# Patient Record
Sex: FEMALE | Race: WHITE | NOT HISPANIC OR LATINO | Employment: OTHER | URBAN - METROPOLITAN AREA
[De-identification: names, ages, dates, MRNs, and addresses within clinical notes are randomized per-mention and may not be internally consistent; named-entity substitution may affect disease eponyms.]

---

## 2021-05-18 ENCOUNTER — TELEPHONE (OUTPATIENT)
Dept: PAIN MEDICINE | Facility: CLINIC | Age: 86
End: 2021-05-18

## 2021-05-18 NOTE — TELEPHONE ENCOUNTER
LMOM FOR PATIENT TO RETURN OUR CALL  REGARDING MISSING INFORMATION FOR REG      INSURANCE  ER CONTACT  PCP     ETC

## 2021-05-26 ENCOUNTER — CONSULT (OUTPATIENT)
Dept: PAIN MEDICINE | Facility: CLINIC | Age: 86
End: 2021-05-26
Payer: COMMERCIAL

## 2021-05-26 VITALS
WEIGHT: 205 LBS | HEART RATE: 90 BPM | TEMPERATURE: 97.3 F | DIASTOLIC BLOOD PRESSURE: 80 MMHG | HEIGHT: 66 IN | SYSTOLIC BLOOD PRESSURE: 158 MMHG | BODY MASS INDEX: 32.95 KG/M2

## 2021-05-26 DIAGNOSIS — M54.16 LUMBAR RADICULOPATHY: ICD-10-CM

## 2021-05-26 DIAGNOSIS — E66.01 MORBID OBESITY (HCC): ICD-10-CM

## 2021-05-26 DIAGNOSIS — M48.061 LUMBAR FORAMINAL STENOSIS: Primary | ICD-10-CM

## 2021-05-26 DIAGNOSIS — R29.898 WEAKNESS OF BOTH LOWER EXTREMITIES: ICD-10-CM

## 2021-05-26 DIAGNOSIS — F32.1 MODERATE MAJOR DEPRESSION (HCC): ICD-10-CM

## 2021-05-26 DIAGNOSIS — E11.8 DISORDER ASSOCIATED WITH TYPE 2 DIABETES MELLITUS (HCC): ICD-10-CM

## 2021-05-26 PROBLEM — M51.35 DEGENERATION OF THORACOLUMBAR INTERVERTEBRAL DISC: Status: ACTIVE | Noted: 2021-05-26

## 2021-05-26 PROBLEM — H35.3290 EXUDATIVE AGE-RELATED MACULAR DEGENERATION (HCC): Status: ACTIVE | Noted: 2021-05-26

## 2021-05-26 PROBLEM — E11.9 DIABETES (HCC): Status: ACTIVE | Noted: 2021-05-26

## 2021-05-26 PROBLEM — R93.89 ABNORMAL CHEST X-RAY: Status: ACTIVE | Noted: 2017-10-08

## 2021-05-26 PROBLEM — N18.31 CHRONIC KIDNEY DISEASE, STAGE 3A (HCC): Status: ACTIVE | Noted: 2021-05-26

## 2021-05-26 PROBLEM — E78.5 HYPERLIPIDEMIA: Status: ACTIVE | Noted: 2021-05-26

## 2021-05-26 PROBLEM — R33.9 URINARY RETENTION: Status: ACTIVE | Noted: 2017-10-07

## 2021-05-26 PROBLEM — R93.89 ABNORMAL MRI: Status: ACTIVE | Noted: 2017-10-07

## 2021-05-26 PROBLEM — IMO0002 DEGENERATION OF INTERVERTEBRAL DISC: Status: ACTIVE | Noted: 2021-05-26

## 2021-05-26 PROBLEM — J45.909 ASTHMA: Status: ACTIVE | Noted: 2021-05-26

## 2021-05-26 PROBLEM — I10 ESSENTIAL HYPERTENSION: Status: ACTIVE | Noted: 2021-05-26

## 2021-05-26 PROCEDURE — 99204 OFFICE O/P NEW MOD 45 MIN: CPT | Performed by: ANESTHESIOLOGY

## 2021-05-26 RX ORDER — IBUPROFEN 200 MG
TABLET ORAL EVERY 6 HOURS PRN
COMMUNITY

## 2021-05-26 RX ORDER — GLIMEPIRIDE 1 MG/1
TABLET ORAL EVERY 24 HOURS
COMMUNITY
Start: 2021-05-17

## 2021-05-26 RX ORDER — FLUTICASONE PROPIONATE 50 MCG
SPRAY, SUSPENSION (ML) NASAL EVERY 24 HOURS
COMMUNITY

## 2021-05-26 RX ORDER — CHOLECALCIFEROL (VITAMIN D3) 100000/G
POWDER (GRAM) MISCELLANEOUS EVERY 24 HOURS
COMMUNITY

## 2021-05-26 RX ORDER — ALBUTEROL SULFATE 2.5 MG/3ML
2.5 SOLUTION RESPIRATORY (INHALATION) EVERY 4 HOURS PRN
COMMUNITY

## 2021-05-26 NOTE — PROGRESS NOTES
Assessment  1  Lumbar foraminal stenosis    2  Lumbar radiculopathy    3  Moderate major depression ([address] )    4  Morbid obesity ([address] )    5  Weakness of both lower extremities    6  Disorder associated with type 2 diabetes mellitus ([address] )        Plan    The patient's pain persists despite time, relative rest, activity modification and therapy  I believe that Aj Watson would benefit from a lumbar epidural steroid injection to diminish any inflammatory component of her pain  I will initially use a transforaminal approach to better concentrate the steroid along the affected nerve root  The injection may need to be repeated based on the degree of pain relief following the initial injection  As I did mention to the patient her daughter that this will we may help with the pain but would not help with her weakness and as she is undergoing and I have undergone physical therapy I am having her evaluated by Neurology  Given the patient's history of diabetes, there may be an increased risk of infection in association with the procedure although the overall risk is low  In addition, following the injection there may be a transient elevation in serum glucose levels for several days  The patient understands that this may require additional glycemic coverage in coordination with the treating physician  In the office today, we reviewed the nature of the patient's pathology in depth using  diagrams and models  I discussed the approach I would use for the epidural steroid injection and provided literature for home review  The patient understands the risks associated with the procedure including but not limited to bleeding, infection, tissue injury, decreased immunity secondary to steroid injection, exacerbation of symptoms, allergic reaction, spinal headache, and paralysis and provided verbal consent      My impressions and treatment recommendations were discussed in detail with the patient who verbalized understanding and had no further questions  Discharge instructions were provided  I personally saw and examined the patient and I agree with the above discussed plan of care  This note is created using dictation transcription  It may contain typographical errors, grammatical errors, improperly dictated words, background noise and other errors  Orders Placed This Encounter   Procedures    FL spine and pain procedure     Standing Status:   Future     Standing Expiration Date:   5/26/2025     Order Specific Question:   Reason for Exam:     Answer:   Left L5 and S1 TFESi 1     Order Specific Question:   Anticoagulant hold needed? Answer:   no    FL spine and pain procedure     Standing Status:   Future     Standing Expiration Date:   5/26/2025     Order Specific Question:   Reason for Exam:     Answer:   Left L5 S1 TFESi 2     Order Specific Question:   Anticoagulant hold needed? Answer:   no    Ambulatory referral to Neurology     Standing Status:   Future     Standing Expiration Date:   5/26/2022     Referral Priority:   Routine     Referral Type:   Consult - AMB     Referral Reason:   Specialty Services Required     Referred to Provider:   Loly Wynn MD     Requested Specialty:   Neurology     Number of Visits Requested:   1     Expiration Date:   5/26/2022     New Medications Ordered This Visit   Medications    Citalopram Hydrobromide (CELEXA PO)     Sig: Take by mouth    fluticasone (Flonase) 50 mcg/act nasal spray     Sig: every 24 hours    glimepiride (AMARYL) 1 mg tablet     Sig: every 24 hours    fluticasone-salmeterol (Advair Diskus) 100-50 mcg/dose inhaler     Sig: Inhale 1 puff 2 (two) times a day as needed     Substitution to a formulary equivalent within the same pharmaceutical class is authorized      Cholecalciferol (Vitamin D3) 788378 UNIT/GM POWD     Sig: every 24 hours    glucose blood test strip     Sig: test once daily    albuterol (2 5 mg/3 mL) 0 083 % nebulizer solution     Sig: Inhale 2 5 mg every 4 (four) hours as needed    ibuprofen (MOTRIN) 200 mg tablet     Sig: Take by mouth every 6 (six) hours as needed for mild pain     Referred By: Self  History of Present Illness    Aftab Artis is a 80 y o  female with a longstanding history of lower extremity weakness with more recently left lower extremity pain  Back in 2017 she had difficulty ambulating and was sent to the emergency department  Full MRI workup and neurological evaluation was performed including spinal tap  More recently she has been having pain and numbness down the posterior lateral aspect of her left leg  MRI does reveal moderate foraminal stenosis at L5-S1  Her pain is moderate to severe occasional worse at night  Describes burning cramping shooting and has subjective weakness of her lower limbs  I have personally reviewed and/or updated the patient's past medical history, past surgical history, family history, social history, current medications, allergies, and vital signs today  Review of Systems   Constitutional: Negative for fever and unexpected weight change  HENT: Negative for trouble swallowing  Eyes: Negative for visual disturbance  Respiratory: Negative for shortness of breath and wheezing  Cardiovascular: Negative for chest pain and palpitations  Gastrointestinal: Negative for constipation, diarrhea, nausea and vomiting  Endocrine: Negative for cold intolerance, heat intolerance and polydipsia  Genitourinary: Negative for difficulty urinating and frequency  Musculoskeletal: Negative for arthralgias, gait problem, joint swelling and myalgias  Skin: Negative for rash  Neurological: Negative for dizziness, seizures, syncope, weakness and headaches  Hematological: Does not bruise/bleed easily  Psychiatric/Behavioral: Negative for dysphoric mood  All other systems reviewed and are negative        Patient Active Problem List   Diagnosis    [facility]bid [facility]    Abnormal chest x-ray    Asthma    Abnormal MRI    Chronic kidney disease, stage 3a (HCC)    Degeneration of intervertebral disc    Degeneration of thoracolumbar intervertebral disc    Diabetes (HCC)    Disorder associated with type 2 diabetes mellitus (HCC)    Essential hypertension    Exudative age-related macular degeneration (HCC)    Hyperlipidemia    Lower extremity weakness    Moderate [facility] )    Urinary retention       Past Medical History:   Diagnosis Date    Asthma     Depression     Diabetes mellitus ([facility] )        Past Surgical History:   Procedure Laterality Date    GALLBLADDER SURGERY      HYSTERECTOMY         History reviewed  No pertinent family history  Social History     Occupational History    Not on file   Tobacco Use    Smoking status: Never Smoker    Smokeless tobacco: Never Used   Substance and Sexual Activity    Alcohol use: Not Currently    Drug use: Not Currently    Sexual activity: Not Currently       Current Outpatient Medications on File Prior to Visit   Medication Sig    albuterol (2 5 mg/3 mL) 0 083 % nebulizer solution Inhale 2 5 mg every 4 (four) hours as needed    Cholecalciferol (Vitamin D3) 544727 UNIT/GM POWD every 24 hours    Citalopram Hydrobromide (CELEXA PO) Take by mouth    fluticasone (Flonase) 50 mcg/act nasal spray every 24 hours    fluticasone-salmeterol (Advair Diskus) 100-50 mcg/dose inhaler Inhale 1 puff 2 (two) times a day as needed    glimepiride (AMARYL) 1 mg tablet every 24 hours    glucose blood test strip test once daily    ibuprofen (MOTRIN) 200 mg tablet Take by mouth every 6 (six) hours as needed for mild pain     No current facility-administered medications on file prior to visit          Allergies   Allergen Reactions    Metformin Other (See Comments)       Physical Exam    /80 (BP Location: Left arm, Patient Position: Sitting, Cuff Size: Standard)   Pulse 90   Temp (!) 97 3 °F (36 3 °C)   Ht 5' 6" (1 676 m)   Wt 93 kg (205 lb)   BMI 33 09 kg/m²     Constitutional: normal, well developed, well nourished, alert, in no distress and non-toxic and no overt pain behavior  and overweight  Eyes: anicteric  HEENT: grossly intact  Neck: supple, symmetric, trachea midline and no masses   Pulmonary:even and unlabored  Cardiovascular:No edema or pitting edema present  Skin:Normal without rashes or lesions and well hydrated  Psychiatric:Mood and affect appropriate  Neurologic:Cranial Nerves II-XII grossly intact  Musculoskeletal:normal and ambulates with cane, difficulty going from sitting to standing sitting position I cannot appreciate any focal motor deficit or lower limbs, negative straight leg raising decreased sensation left L5 distribution to pinwheel, deep tendon reflexes diminished but symmetrical bilateral patella Achilles    Imaging  Xray Lumbar @ [facility] 5-13-21  Impression:  Thoracolumbar degenerative disc disease  MR Thoracic spine wo/w @ [facility] 10-7-17  Impression:  1  Small scattered area of thoracic cord signal abnormality without enhancement which may represent a demyelinating process  2  No disc herniation, central canal narrowing, or foraminal narrowing in the thoracic spine  MR Lumbar Spine wo/w @ [facility] 10-7-17  Impression:  1  Diffused disc bulge with overlaying endplate osteophytes at L5-S1 causing moderate bilateral foraminal narrowing  2  Mild disc bulges at L3-L4 and L4-5 causing mild bilateral foraminal narrowing  MR Spinal cord survey @ [facility] 10-4-17    Impression:  1  No evidence of spinal stenosis at any level  2  Mild degenerative changes of the cervical spine  Straightening of the cervical and thoracic spine, possibly positional     I have personally reviewed pertinent films in PACS

## 2021-05-26 NOTE — PATIENT INSTRUCTIONS
Epidural Steroid Injection   AMBULATORY CARE:   What you need to know about an epidural steroid injection (EVER):  An EVER is a procedure to inject steroid medicine into the epidural space  The epidural space is between your spinal cord and vertebrae  Steroids reduce inflammation and fluid buildup in your spine that may be causing pain  You may be given pain medicine along with the steroids  How to prepare for an EVER:  Your healthcare provider will talk to you about how to prepare for your procedure  He or she will tell you what medicines to take or not take on the day of your procedure  You may need to stop taking blood thinners or other medicines several days before your procedure  You may need to adjust any diabetes medicine you take on the day of your procedure  Steroid medicine can increase your blood sugar level  Arrange for someone to drive you home when you are discharged  What will happen during an EVER:   · You will be given medicine to numb the procedure area  You will be awake for the procedure, but you will not feel pain  You may also be given medicine to help you relax  Contrast liquid will be used to help your healthcare provider see the area better  Tell the healthcare provider if you have ever had an allergic reaction to contrast liquid  · Your healthcare provider may place the needle into your neck area, middle of your back, or tailbone area  He may inject the medicine next to the nerves that are causing your pain  He may instead inject the medicine into a larger area of the epidural space  This helps the medicine spread to more nerves  Your healthcare provider will use a fluoroscope to help guide the needle to the right place  A fluoroscope is a type of x-ray  After the procedure, a bandage will be placed over the injection site to prevent infection  What will happen after an EVER:  You will have a bandage over the injection site to prevent infection   Your healthcare provider will tell you when you can bathe and any activity guidelines  You will be able to go home  Risks of an EVER:  You may have temporary or permanent nerve damage or paralysis  You may have bleeding or develop a serious infection, such as meningitis (swelling of the brain coverings)  An abscess may also develop  An abscess is a pus-filled area under the skin  You may need surgery to fix the abscess  You may have a seizure, anxiety, or trouble sleeping  If you are a man, you may have temporary erectile dysfunction (not able to have an erection)  Call your local emergency number (911 in the [address] [place] [address],[address]) if:   · You have a seizure  · You have trouble moving your legs  Seek care immediately if:   · Blood soaks through your bandage  · You have a fever or chills, severe back pain, and the procedure area is sensitive to the touch  · You cannot control when you urinate or have a bowel movement  Call your doctor if:   · You have weakness or numbness in your legs  · Your wound is red, swollen, or draining pus  · You have nausea or are vomiting  · Your face or neck is red and you feel warm  · You have more pain than you had before the procedure  · You have swelling in your hands or feet  · You have questions or concerns about your condition or care  Care for your wound as directed: You may remove the bandage before you go to bed the day of your procedure  You may take a shower, but do not take a bath for at least 24 hours  Self-care:   · Do not drive,  use machines, or do strenuous activity for 24 hours after your procedure or as directed  · Continue other treatments  as directed  Steroid injections alone will not control your pain  The injections are meant to be used with other treatments, such as physical therapy  Follow up with your doctor as directed:  Write down your questions so you remember to ask them during your visits     © Copyright [employer] 2020 Information is for End User's use only and may not be sold, redistributed or otherwise used for commercial purposes  All illustrations and images included in CareNotes® are the copyrighted property of A [employer] M [employer]  or [employer] Nowak  The above information is an  only  It is not intended as medical advice for individual conditions or treatments  Talk to your doctor, nurse or pharmacist before following any medical regimen to see if it is safe and effective for you

## 2021-06-11 ENCOUNTER — HOSPITAL ENCOUNTER (OUTPATIENT)
Dept: RADIOLOGY | Facility: CLINIC | Age: 86
Discharge: HOME | End: 2021-06-11
Attending: ANESTHESIOLOGY | Admitting: ANESTHESIOLOGY
Payer: COMMERCIAL

## 2021-06-11 VITALS
DIASTOLIC BLOOD PRESSURE: 79 MMHG | OXYGEN SATURATION: 95 % | SYSTOLIC BLOOD PRESSURE: 156 MMHG | HEART RATE: 96 BPM | TEMPERATURE: 96.8 F | RESPIRATION RATE: 20 BRPM

## 2021-06-11 DIAGNOSIS — M54.16 LUMBAR RADICULOPATHY: ICD-10-CM

## 2021-06-11 DIAGNOSIS — M48.061 LUMBAR FORAMINAL STENOSIS: ICD-10-CM

## 2021-06-11 PROCEDURE — 64483 NJX AA&/STRD TFRM EPI L/S 1: CPT | Performed by: ANESTHESIOLOGY

## 2021-06-11 PROCEDURE — 64484 NJX AA&/STRD TFRM EPI L/S EA: CPT | Performed by: ANESTHESIOLOGY

## 2021-06-11 RX ORDER — METHYLPREDNISOLONE ACETATE 80 MG/ML
160 INJECTION, SUSPENSION INTRA-ARTICULAR; INTRALESIONAL; INTRAMUSCULAR; PARENTERAL; SOFT TISSUE ONCE
Status: COMPLETED | OUTPATIENT
Start: 2021-06-11 | End: 2021-06-11

## 2021-06-11 RX ADMIN — IOHEXOL 2 ML: 300 INJECTION, SOLUTION INTRAVENOUS at 13:03

## 2021-06-11 RX ADMIN — LIDOCAINE HYDROCHLORIDE 2 ML: 20 INJECTION, SOLUTION EPIDURAL; INFILTRATION; INTRACAUDAL at 13:03

## 2021-06-11 RX ADMIN — METHYLPREDNISOLONE ACETATE 160 MG: 80 INJECTION, SUSPENSION INTRA-ARTICULAR; INTRALESIONAL; INTRAMUSCULAR; SOFT TISSUE at 13:03

## 2021-06-11 NOTE — DISCHARGE INSTRUCTIONS
Epidural Steroid Injection   WHAT YOU NEED TO KNOW:   An epidural steroid injection (EVER) is a procedure to inject steroid medicine into the epidural space  The epidural space is between your spinal cord and vertebrae  Steroids reduce inflammation and fluid buildup in your spine that may be causing pain  You may be given pain medicine along with the steroids  ACTIVITY  · Do not drive or operate machinery today  · No strenuous activity today - bending, lifting, etc   · You may resume normal activites starting tomorrow - start slowly and as tolerated  · You may shower today, but no tub baths or hot tubs  · You may have numbness for several hours from the local anesthetic  Please use caution and common sense, especially with weight-bearing activities  CARE OF THE INJECTION SITE  · If you have soreness or pain, apply ice to the area today (20 minutes on/20 minutes off)  · Starting tomorrow, you may use warm, moist heat or ice if needed  · You may have an increase or change in your discomfort for 36-48 hours after your treatment  · Apply ice and continue with any pain medication you have been prescribed  · Notify the [facility] if you have any of the following: redness, drainage, swelling, headache, stiff neck or fever above 100°F     SPECIAL INSTRUCTIONS  · Our office will contact you in approximately 7 days for a progress report  MEDICATIONS  · Continue to take all routine medications  · Our office may have instructed you to hold some medications  As no general anesthesia was used in today's procedure, you should not experience any side effects related to anesthesia  If you have a problem specifically related to your procedure, please call our office at [phone]  Problems not related to your procedure should be directed to your primary care physician 
no

## 2021-06-11 NOTE — H&P
History of Present Illness: The patient is a 80 y o  female who presents with complaints of low back and leg pain      Patient Active Problem List   Diagnosis    Mor[facility] )    Abnormal chest x-ray    Asthma    Abnormal MRI    Chronic kidney disease, stage 3a ([facility] )    Degeneration of intervertebral disc    Degeneration of thoracolumbar intervertebral disc    Diabetes ([facility] )    [facility] with [facility]    Essential hypertension    Exudative age-related macular degeneration (HCC)    Hyperlipidemia    Lower extremity weakness    Moderate major depression (HCC)    Urinary retention    Lumbar foraminal stenosis    Lumbar radiculopathy       Past Medical History:   Diagnosis Date    Asthma     Depression     [facility]        Past Surgical History:   Procedure Laterality Date    GALLBLADDER SURGERY      HYSTERECTOMY           Current Outpatient Medications:     albuterol (2 5 mg/3 mL) 0 083 % nebulizer solution, Inhale 2 5 mg every 4 (four) hours as needed, Disp: , Rfl:     Cholecalciferol (Vitamin D3) 566598 UNIT/GM POWD, every 24 hours, Disp: , Rfl:     Citalopram Hydrobromide (CELEXA PO), Take by mouth, Disp: , Rfl:     fluticasone (Flonase) 50 mcg/act nasal spray, every 24 hours, Disp: , Rfl:     fluticasone-salmeterol (Advair Diskus) 100-50 mcg/dose inhaler, Inhale 1 puff 2 (two) times a day as needed, Disp: , Rfl:     glimepiride (AMARYL) 1 mg tablet, every 24 hours, Disp: , Rfl:     glucose blood test strip, test once daily, Disp: , Rfl:     ibuprofen (MOTRIN) 200 mg tablet, Take by mouth every 6 (six) hours as needed for mild pain, Disp: , Rfl:     Current Facility-Administered Medications:     iohexol (OMNIPAQUE) 300 mg/mL injection 50 mL, 50 mL, Epidural, Once, Jignesh Baker DO    lidocaine (PF) (XYLOCAINE-MPF) 2 % injection 5 mL, 5 mL, Epidural, Once, Jignesh Baker DO    methylPREDNISolone acetate (DEPO-MEDROL) injection 160 mg, 160 mg, Epidural, Once, Jignesh Baker, DO    Allergies   Allergen Reactions    Metformin Other (See Comments)       Physical Exam:   Vitals:    06/11/21 1252   BP: 162/81   Pulse: 67   Resp: 20   Temp: (!) 96 8 °F (36 °C)     General: Awake, Alert, Oriented x 3, Mood and affect appropriate  Respiratory: Respirations even and unlabored  Cardiovascular: Peripheral pulses intact; no edema  Musculoskeletal Exam:  Decreased range of motion lumbar spine    ASA Score: III    Patient/Chart Verification  Patient ID Verified: Verbal  ID Band Applied: No  Consents Confirmed: Procedural, To be obtained in the Pre-Procedure area  H&P( within 30 days) Verified: To be obtained in the Pre-Procedure area  Allergies Reviewed: Yes  Anticoag/NSAID held?: NA  Currently on antibiotics?: No    Assessment:   1  Lumbar foraminal stenosis    2   Lumbar radiculopathy        Plan: Left L5 and S1 TFESi 1

## 2021-06-11 NOTE — NURSING NOTE
Pt and daughter Anika Enriquez advised that pt should contact PCP d/t O2 sats 88-89%on room air both with and without mask on  Pt states that she gets like this when she is nervous  Post procedure O2 sat 95%  Per daughter, pt required O2 while in the hospital last admission  The patient and daughter verbalized understanding

## 2021-06-11 NOTE — H&P
History of Present Illness: The patient is a 80 y o  female who presents with complaints of low back and leg pain      Patient Active Problem List   Diagnosis    Morbid [facility] [address] )    Abnormal chest x-ray    Asthma    Abnormal MRI    Chronic kidney disease, stage 3a ([facility] [facility] )    Degeneration of intervertebral disc    Degeneration of thoracolumbar intervertebral disc    Diabetes ([facility] )    [facility] with [facility]    Essential hypertension    Exudative age-related macular degeneration (HCC)    Hyperlipidemia    Lower extremity weakness    Moderate major depression (HCC)    Urinary retention    Lumbar foraminal stenosis    Lumbar radiculopathy       Past Medical History:   Diagnosis Date    Asthma     Depression     [facility]        Past Surgical History:   Procedure Laterality Date    GALLBLADDER SURGERY      HYSTERECTOMY           Current Outpatient Medications:     albuterol (2 5 mg/3 mL) 0 083 % nebulizer solution, Inhale 2 5 mg every 4 (four) hours as needed, Disp: , Rfl:     Cholecalciferol (Vitamin D3) 099206 UNIT/GM POWD, every 24 hours, Disp: , Rfl:     Citalopram Hydrobromide (CELEXA PO), Take by mouth, Disp: , Rfl:     fluticasone (Flonase) 50 mcg/act nasal spray, every 24 hours, Disp: , Rfl:     fluticasone-salmeterol (Advair Diskus) 100-50 mcg/dose inhaler, Inhale 1 puff 2 (two) times a day as needed, Disp: , Rfl:     glimepiride (AMARYL) 1 mg tablet, every 24 hours, Disp: , Rfl:     glucose blood test strip, test once daily, Disp: , Rfl:     ibuprofen (MOTRIN) 200 mg tablet, Take by mouth every 6 (six) hours as needed for mild pain, Disp: , Rfl:     Current Facility-Administered Medications:     iohexol (OMNIPAQUE) 300 mg/mL injection 50 mL, 50 mL, Epidural, Once, Jignesh Baker DO    lidocaine (PF) (XYLOCAINE-MPF) 2 % injection 5 mL, 5 mL, Epidural, Once, Jignesh Baker DO    methylPREDNISolone acetate (DEPO-MEDROL) injection 160 mg, 160 mg, Epidural, Once, [employer], DO    Allergies   Allergen Reactions    Metformin Other (See Comments)       Physical Exam:   General: Awake, Alert, Oriented x 3, Mood and affect appropriate  Respiratory: Respirations even and unlabored  Cardiovascular: Peripheral pulses intact; no edema  Musculoskeletal Exam:  Decreased range of motion lumbar spine    ASA Score: II         Assessment:  Lumbar foraminal stenosis, lumbar radiculitis    Plan: Left L5 and S1 TFESi 1

## 2021-06-18 ENCOUNTER — TELEPHONE (OUTPATIENT)
Dept: PAIN MEDICINE | Facility: CLINIC | Age: 86
End: 2021-06-18

## 2021-06-18 NOTE — TELEPHONE ENCOUNTER
Pt is returning call stating she has numbness from her legs to her feet and some pain     Pain level 1/10

## 2021-06-24 NOTE — TELEPHONE ENCOUNTER
Attempted to contact pt, left a detailed message on machine per medical communication consent on file advising of Dr Shelly Esposito and provided contact info: [phone]  Provided cb number and office hours

## 2021-06-24 NOTE — TELEPHONE ENCOUNTER
Pt can not get in with Dr Pro Vaughn until 11/21  Is there anyone else you can referrer pt to   Please call Amelia Buchanan (daughter) # [phone]

## 2021-07-02 ENCOUNTER — HOSPITAL ENCOUNTER (OUTPATIENT)
Dept: RADIOLOGY | Facility: CLINIC | Age: 86
Discharge: HOME | End: 2021-07-02
Attending: ANESTHESIOLOGY | Admitting: ANESTHESIOLOGY
Payer: COMMERCIAL

## 2021-07-02 VITALS
RESPIRATION RATE: 20 BRPM | HEART RATE: 97 BPM | SYSTOLIC BLOOD PRESSURE: 173 MMHG | OXYGEN SATURATION: 92 % | TEMPERATURE: 97.2 F | DIASTOLIC BLOOD PRESSURE: 79 MMHG

## 2021-07-02 DIAGNOSIS — M54.16 LUMBAR RADICULOPATHY: ICD-10-CM

## 2021-07-02 DIAGNOSIS — M48.061 LUMBAR FORAMINAL STENOSIS: ICD-10-CM

## 2021-07-02 PROCEDURE — 62323 NJX INTERLAMINAR LMBR/SAC: CPT | Performed by: ANESTHESIOLOGY

## 2021-07-02 RX ORDER — METHYLPREDNISOLONE ACETATE 80 MG/ML
80 INJECTION, SUSPENSION INTRA-ARTICULAR; INTRALESIONAL; INTRAMUSCULAR; PARENTERAL; SOFT TISSUE ONCE
Status: COMPLETED | OUTPATIENT
Start: 2021-07-02 | End: 2021-07-02

## 2021-07-02 RX ORDER — METHYLPREDNISOLONE ACETATE 80 MG/ML
160 INJECTION, SUSPENSION INTRA-ARTICULAR; INTRALESIONAL; INTRAMUSCULAR; PARENTERAL; SOFT TISSUE ONCE
Status: DISCONTINUED | OUTPATIENT
Start: 2021-07-02 | End: 2021-07-02

## 2021-07-02 RX ADMIN — IOHEXOL 1 ML: 300 INJECTION, SOLUTION INTRAVENOUS at 12:31

## 2021-07-02 RX ADMIN — METHYLPREDNISOLONE ACETATE 80 MG: 80 INJECTION, SUSPENSION INTRA-ARTICULAR; INTRALESIONAL; INTRAMUSCULAR; SOFT TISSUE at 12:31

## 2021-07-02 NOTE — H&P
History of Present Illness: The patient is a 80 y o  female who presents with complaints of low back and leg pain      Patient Active Problem List   Diagnosis    Morbid obesity ([facility] [address] [facility]    Abnormal chest x-ray    Asthma    Abnormal MRI    Chronic kidney disease, stage 3a ([facility] [address] )    Degeneration of intervertebral disc    Degeneration of thoracolumbar intervertebral disc    [facility]    Disorder associated with type 2 diabetes mellitus [facility]    Essential hypertension    Exudative age-related macular degeneration (HCC)    Hyperlipidemia    Lower extremity weakness    Moderate major depression (HCC)    Urinary retention    Lumbar foraminal stenosis    Lumbar radiculopathy       Past Medical History:   Diagnosis Date    Asthma     Depression     [facility]        Past Surgical History:   Procedure Laterality Date    GALLBLADDER SURGERY      HYSTERECTOMY           Current Outpatient Medications:     albuterol (2 5 mg/3 mL) 0 083 % nebulizer solution, Inhale 2 5 mg every 4 (four) hours as needed, Disp: , Rfl:     Cholecalciferol (Vitamin D3) 758560 UNIT/GM POWD, every 24 hours, Disp: , Rfl:     Citalopram Hydrobromide (CELEXA PO), Take by mouth, Disp: , Rfl:     fluticasone (Flonase) 50 mcg/act nasal spray, every 24 hours, Disp: , Rfl:     fluticasone-salmeterol (Advair Diskus) 100-50 mcg/dose inhaler, Inhale 1 puff 2 (two) times a day as needed, Disp: , Rfl:     glimepiride (AMARYL) 1 mg tablet, every 24 hours, Disp: , Rfl:     glucose blood test strip, test once daily, Disp: , Rfl:     ibuprofen (MOTRIN) 200 mg tablet, Take by mouth every 6 (six) hours as needed for mild pain, Disp: , Rfl:     Current Facility-Administered Medications:     iohexol (OMNIPAQUE) 300 mg/mL injection 50 mL, 50 mL, Epidural, Once, Jignesh Baker DO    methylPREDNISolone acetate (DEPO-MEDROL) injection 160 mg, 160 mg, Epidural, Once, Jignesh Baker DO    Allergies   Allergen Reactions    Metformin Other (See Comments)       Physical Exam:   General: Awake, Alert, Oriented x 3, Mood and affect appropriate  Respiratory: Respirations even and unlabored  Cardiovascular: Peripheral pulses intact; no edema  Musculoskeletal Exam:  Decreased range of motion lumbar spine    ASA Score: III         Assessment:   1  Lumbar foraminal stenosis    2   Lumbar radiculopathy        Plan: Left L5 S1 TFESi 2

## 2021-07-02 NOTE — DISCHARGE INSTRUCTIONS
Epidural Steroid Injection   WHAT YOU NEED TO KNOW:   An epidural steroid injection (EVER) is a procedure to inject steroid medicine into the epidural space  The epidural space is between your spinal cord and vertebrae  Steroids reduce inflammation and fluid buildup in your spine that may be causing pain  You may be given pain medicine along with the steroids  ACTIVITY  · Do not drive or operate machinery today  · No strenuous activity today - bending, lifting, etc   · You may resume normal activites starting tomorrow - start slowly and as tolerated  · You may shower today, but no tub baths or hot tubs  · You may have numbness for several hours from the local anesthetic  Please use caution and common sense, especially with weight-bearing activities  CARE OF THE INJECTION SITE  · If you have soreness or pain, apply ice to the area today (20 minutes on/20 minutes off)  · Starting tomorrow, you may use warm, moist heat or ice if needed  · You may have an increase or change in your discomfort for 36-48 hours after your treatment  · Apply ice and continue with any pain medication you have been prescribed  · Notify [facility] if you have any of the following: redness, drainage, swelling, headache, stiff neck or fever above 100°F     SPECIAL INSTRUCTIONS  · Our office will contact you in approximately 7 days for a progress report  MEDICATIONS  · Continue to take all routine medications  · Our office may have instructed you to hold some medications  As no general anesthesia was used in today's procedure, you should not experience any side effects related to anesthesia  If you have a problem specifically related to your procedure, please call our office at [phone]  Problems not related to your procedure should be directed to your primary care physician

## 2021-07-09 ENCOUNTER — TELEPHONE (OUTPATIENT)
Dept: PAIN MEDICINE | Facility: CLINIC | Age: 86
End: 2021-07-09